# Patient Record
Sex: FEMALE | Race: WHITE | Employment: FULL TIME | URBAN - METROPOLITAN AREA
[De-identification: names, ages, dates, MRNs, and addresses within clinical notes are randomized per-mention and may not be internally consistent; named-entity substitution may affect disease eponyms.]

---

## 2017-03-28 ENCOUNTER — OFFICE VISIT (OUTPATIENT)
Dept: SLEEP MEDICINE | Age: 52
End: 2017-03-28

## 2017-03-28 VITALS
WEIGHT: 293 LBS | HEIGHT: 65 IN | OXYGEN SATURATION: 97 % | HEART RATE: 78 BPM | SYSTOLIC BLOOD PRESSURE: 116 MMHG | BODY MASS INDEX: 48.82 KG/M2 | DIASTOLIC BLOOD PRESSURE: 80 MMHG

## 2017-03-28 DIAGNOSIS — G47.33 OBSTRUCTIVE SLEEP APNEA SYNDROME: Primary | Chronic | ICD-10-CM

## 2017-03-28 DIAGNOSIS — J45.909 UNCOMPLICATED ASTHMA, UNSPECIFIED ASTHMA SEVERITY: Chronic | ICD-10-CM

## 2017-03-28 DIAGNOSIS — E66.01 MORBID OBESITY, UNSPECIFIED OBESITY TYPE (HCC): Chronic | ICD-10-CM

## 2017-03-28 PROCEDURE — 99213 OFFICE O/P EST LOW 20 MIN: CPT | Performed by: INTERNAL MEDICINE

## 2017-03-28 ASSESSMENT — SLEEP AND FATIGUE QUESTIONNAIRES
ESS TOTAL SCORE: 3
HOW LIKELY ARE YOU TO NOD OFF OR FALL ASLEEP WHILE SITTING AND READING: 1
HOW LIKELY ARE YOU TO NOD OFF OR FALL ASLEEP WHILE SITTING AND TALKING TO SOMEONE: 0
HOW LIKELY ARE YOU TO NOD OFF OR FALL ASLEEP WHILE SITTING QUIETLY AFTER LUNCH WITHOUT ALCOHOL: 0
HOW LIKELY ARE YOU TO NOD OFF OR FALL ASLEEP WHILE SITTING INACTIVE IN A PUBLIC PLACE: 0
HOW LIKELY ARE YOU TO NOD OFF OR FALL ASLEEP WHILE LYING DOWN TO REST IN THE AFTERNOON WHEN CIRCUMSTANCES PERMIT: 1
HOW LIKELY ARE YOU TO NOD OFF OR FALL ASLEEP WHILE WATCHING TV: 1
HOW LIKELY ARE YOU TO NOD OFF OR FALL ASLEEP WHEN YOU ARE A PASSENGER IN A CAR FOR AN HOUR WITHOUT A BREAK: 0
HOW LIKELY ARE YOU TO NOD OFF OR FALL ASLEEP IN A CAR, WHILE STOPPED FOR A FEW MINUTES IN TRAFFIC: 0

## 2017-03-28 ASSESSMENT — ENCOUNTER SYMPTOMS
SHORTNESS OF BREATH: 0
RHINORRHEA: 0
COUGH: 0
APNEA: 0
CHOKING: 0

## 2017-12-20 ENCOUNTER — OFFICE VISIT (OUTPATIENT)
Dept: ORTHOPEDIC SURGERY | Age: 52
End: 2017-12-20

## 2017-12-20 VITALS — HEIGHT: 65 IN | BODY MASS INDEX: 48.82 KG/M2 | WEIGHT: 293 LBS

## 2017-12-20 DIAGNOSIS — M25.561 ACUTE PAIN OF RIGHT KNEE: Primary | ICD-10-CM

## 2017-12-20 PROCEDURE — 99214 OFFICE O/P EST MOD 30 MIN: CPT | Performed by: ORTHOPAEDIC SURGERY

## 2017-12-20 PROCEDURE — 73564 X-RAY EXAM KNEE 4 OR MORE: CPT | Performed by: ORTHOPAEDIC SURGERY

## 2017-12-20 RX ORDER — NAPROXEN 500 MG/1
500 TABLET ORAL 2 TIMES DAILY WITH MEALS
Qty: 60 TABLET | Refills: 2 | Status: SHIPPED | OUTPATIENT
Start: 2017-12-20 | End: 2018-03-19 | Stop reason: SDUPTHER

## 2017-12-20 NOTE — PROGRESS NOTES
Examination  Inspection:  No abrasions no lacerations no signs of infection or obvious deformity moderate obvious  swelling and joint effusion     Palpation:   Palpation reveals moderate  Pain along the medial joint line,   Mild lateral pain along the joint line ,  mild joint effusion noted today. Range of Motion:  0 - 140° flexion/ extension   Hip extension to 20 hip flexion to 70+  Lumbar ROM -20 extension flexion to 6 inches from the floor      Strength: Quadriceps testing 5/5 , hamstring muscle testing 5/5, EHL against resistance is 5/5, hip flexor strength is intact 5/5, internal and external rotation of the hip against resistance is also intact 5/5    Special Tests: stable Lachman, negative anterior drawer, negative pivot shift, no posterior sag no posterior drawer does not open to valgus or varus stress at 0 or 30° positive, Steinmann's positive, Yenny's negative, Homans negative Justin, pedal pulses are +2/4 capillary refill is brisk sensation is intact ankle exam and hip exam are shows no pain with full range of motion provocative testing of the hip is nonpainful muscle testing around the hip is 5 over 5. Lumbar flexion to 6 inches from floor with out pain    Gait: antalgic     Reflex:    Lower extremity Deep tendon reflexes +2/4 and equal bilaterally for patella and Achilles  Upper extremity reflexes:  of the biceps, triceps, brachioradialis +2/4 equal bilaterally    Contralateral  Knee: Negative Lachman negative anterior drawer negative pivot shift no posterior sag no posterior drawer does not open to valgus or varus stress at 0 or 30° negative Steinmann's negative Yenny's negative Homans negative Justin pedal pulses are +2/4 capillary refill is brisk sensation is intact ankle exam and hip exam are shows no pain with full range of motion provocative testing of the hip is nonpainful muscle testing around the hip is 5 over 5.       Lumbar exam:    L1: good strength of the iliopsoas muscle upper lateral thigh sensation is intact  L2: good strength of the iliopsoas muscle and medial epicondyle sensation is intact Lateral thigh sensation is intact  L3: good strength with out pain of obturator externus muscle sensation is intact to medial epicondyle of the femur   L4:Good quadratus strength and gluteus medius and minimus strength, sensation is intact to medial malleolus  L5:Intact Extensor hallucis and tibialis posterior strength, sensation is intact to dorsum of the foot. S1:  Plantar foot sensation is intact  S2:  Posterior thigh and calf sensation is intact      Hip and lumbar testing does not reproduce pain provocative testing does not reproduce the symptomatology. Additional Examinations:  Thoracic Spine: Examination of the thoracic spine does not show any tenderness, deformity or injury. Range of motion is unremarkable. There is no gross instability. There are no rashes, ulcerations or lesions. Strength and tone are normal.  Lower Back: Examination of the lower back does not show any tenderness, deformity or injury. Range of motion is unremarkable. There is no gross instability. There are no rashes, ulcerations or lesions. Strength and tone are normal.          IMPRESSION:    Diagnostic testing:  X-rays obtained and reviewed in office:  I reviewed multiple X-rays of the right knee today, anterior posterior, lateral, notch view, skyline view:  Show no fracture no dislocation early signs of arthritic wear, decreased space maintenance, no masses or tumors, no signs of any significant osteopenia, no obvious OCD lesions, no loose bodies seen.        Impression moderate patellofemoral arthritis some decreased joint space  MRI: Ordered today to rule out loose body versus meniscus tear  Labs: None ordered      Past Surgical History:   Procedure Laterality Date     SECTION      CHOLECYSTECTOMY      HERNIA REPAIR      JOINT REPLACEMENT      KNEE    KNEE ARTHROSCOPY      KNEE ARTHROSCOPY  12/17/10    left partial medial menisectomy,chondroplasy and synovectomy    KNEE ARTHROSCOPY Left 5/3/13    PARTIAL MEDIAL AND LATERAL MENISECTOMY,CHONDROPLASTY,MEDIAL FEMORAL CHONDYLE,, LATERAL FEMORAL CHONDYLE AND SYNOVECTOMY    OTHER SURGICAL HISTORY  2/26/15    SEPTAL RECONSTRUCTION AND REDUCTION OF INFERIOR TURBINATES    TONSILLECTOMY     . Office Procedures:  Orders Placed This Encounter   Procedures    XR KNEE RIGHT (MIN 4 VIEWS)     [id number]     Order Specific Question:   Reason for exam:     Answer:   Pain       Previous Treatments:  Left knee MRI and total knee arthroplasty, X-ray, anti-inflammatories,    Differential diagnosis: Medial meniscal tear, Lateral meniscal tear, Synovitis,  Loose body, stress fracture, patella femoral syndrome, osteoarthritis, chondral lesion, ACL tear, PCL injury, MCL or LCL injury, Capsular injury, infection, contusion, hip pathology, lumbar radiculopathy, Muscle injury, bone tumor, fracture, femoral acetabular osteoarthritis,    Diagnosis: Right knee osteoarthritis and loose body with meniscus tear        Plan: (Medical Decision Making)    1. Medications - Naprosyn 500 mg  2. PT - in the future  3. Further imaging - MRI  4. Follow up - after MRI    Javier Hawng. [facility]  Board Certified  [employer] Team Physician

## 2018-01-03 ENCOUNTER — OFFICE VISIT (OUTPATIENT)
Dept: ORTHOPEDIC SURGERY | Age: 53
End: 2018-01-03

## 2018-01-03 VITALS — HEIGHT: 65 IN | BODY MASS INDEX: 48.82 KG/M2 | WEIGHT: 293 LBS

## 2018-01-03 DIAGNOSIS — M17.11 PRIMARY OSTEOARTHRITIS OF RIGHT KNEE: Primary | ICD-10-CM

## 2018-01-03 PROCEDURE — 99214 OFFICE O/P EST MOD 30 MIN: CPT | Performed by: ORTHOPAEDIC SURGERY

## 2018-03-19 RX ORDER — NAPROXEN 500 MG/1
TABLET ORAL
Qty: 60 TABLET | Refills: 1 | Status: SHIPPED | OUTPATIENT
Start: 2018-03-19 | End: 2018-05-24 | Stop reason: SDUPTHER

## 2018-03-27 ENCOUNTER — OFFICE VISIT (OUTPATIENT)
Dept: SLEEP MEDICINE | Age: 53
End: 2018-03-27

## 2018-03-27 VITALS
HEIGHT: 65 IN | DIASTOLIC BLOOD PRESSURE: 84 MMHG | OXYGEN SATURATION: 95 % | WEIGHT: 293 LBS | SYSTOLIC BLOOD PRESSURE: 138 MMHG | HEART RATE: 89 BPM | BODY MASS INDEX: 48.82 KG/M2

## 2018-03-27 DIAGNOSIS — E66.01 MORBID OBESITY, UNSPECIFIED OBESITY TYPE (HCC): Chronic | ICD-10-CM

## 2018-03-27 DIAGNOSIS — G47.33 OBSTRUCTIVE SLEEP APNEA SYNDROME: Primary | Chronic | ICD-10-CM

## 2018-03-27 PROCEDURE — 99213 OFFICE O/P EST LOW 20 MIN: CPT | Performed by: INTERNAL MEDICINE

## 2018-03-27 ASSESSMENT — SLEEP AND FATIGUE QUESTIONNAIRES
HOW LIKELY ARE YOU TO NOD OFF OR FALL ASLEEP WHILE LYING DOWN TO REST IN THE AFTERNOON WHEN CIRCUMSTANCES PERMIT: 0
ESS TOTAL SCORE: 4
HOW LIKELY ARE YOU TO NOD OFF OR FALL ASLEEP WHILE SITTING QUIETLY AFTER LUNCH WITHOUT ALCOHOL: 0
HOW LIKELY ARE YOU TO NOD OFF OR FALL ASLEEP WHEN YOU ARE A PASSENGER IN A CAR FOR AN HOUR WITHOUT A BREAK: 1
HOW LIKELY ARE YOU TO NOD OFF OR FALL ASLEEP WHILE WATCHING TV: 1
HOW LIKELY ARE YOU TO NOD OFF OR FALL ASLEEP IN A CAR, WHILE STOPPED FOR A FEW MINUTES IN TRAFFIC: 0
HOW LIKELY ARE YOU TO NOD OFF OR FALL ASLEEP WHILE SITTING AND READING: 2
HOW LIKELY ARE YOU TO NOD OFF OR FALL ASLEEP WHILE SITTING INACTIVE IN A PUBLIC PLACE: 0
HOW LIKELY ARE YOU TO NOD OFF OR FALL ASLEEP WHILE SITTING AND TALKING TO SOMEONE: 0

## 2018-03-27 ASSESSMENT — ENCOUNTER SYMPTOMS
RHINORRHEA: 0
COUGH: 0
APNEA: 0
SHORTNESS OF BREATH: 0
CHOKING: 0

## 2018-03-27 NOTE — LETTER
[facility]  [address]  [facility] [address]  Phone: [phone]  Fax: [phone]    March 27, 2018       Patient: Mendez Hilario   MR Number: [id number]   YOB: 1965   Date of Visit: 3/27/2018       Alyse Roach was seen for a follow up visit today. Here is my assessment and plan as well as an attached copy of her visit today:      ASSESSMENT:  Martin Tierney was seen today for sleep apnea. Diagnoses and all orders for this visit:    Obstructive sleep apnea syndrome    Morbid obesity, unspecified obesity type ([facility] [address]        Plan:     Reviewed compliance download with pt. Supplies and parts as needed for her machine. These are medically necessary. Continue medications per her PCP and other physicians. Limit caffeine use after 3pm.  Encouraged her to work on weight loss through diet and exercise. The primary encounter diagnosis was Obstructive sleep apnea syndrome. A diagnosis of Morbid obesity, unspecified obesity type [facility]) was also pertinent to this visit. The chronic medical conditions listed are directly related to the primary diagnosis listed above. The management of the primary diagnosis affects the secondary diagnosis and vice versa. 12 month f/u. If you have questions or concerns, please do not hesitate to call me. I look forward to following Samra along with you.     Sincerely,      Marin Pryor MD    CC providers:  Radha Argueta [phone]  VIA Mail

## 2018-03-27 NOTE — PROGRESS NOTES
daily.      albuterol (PROVENTIL HFA;VENTOLIN HFA) 108 (90 BASE) MCG/ACT inhaler Inhale 2 puffs into the lungs every 6 hours as needed for Wheezing.  lovastatin (MEVACOR) 10 MG tablet Take 10 mg by mouth nightly.  desloratadine (CLARINEX) 5 MG tablet Take 5 mg by mouth.  fluticasone (FLONASE) 50 MCG/ACT nasal spray 2 sprays by Nasal route.  pravastatin (PRAVACHOL) 40 MG tablet Take 40 mg by mouth daily.  montelukast (SINGULAIR) 10 MG tablet Take 10 mg by mouth nightly.  Ciclesonide (ALVESCO) 160 MCG/ACT AERS Inhale 1 puff into the lungs 2 times daily.  hydrochlorothiazide (HYDRODIURIL) 25 MG tablet Take 25 mg by mouth daily. No current facility-administered medications for this visit.         Allergies as of 2018 - Review Complete 2018   Allergen Reaction Noted    Biaxin [clarithromycin] Shortness Of Breath and Swelling 12/10/2010    Cephalexin Shortness Of Breath and Swelling 12/10/2010    Clindamycin/lincomycin Hives 12/10/2010    Penicillins Hives 12/10/2010    Sulfa antibiotics  2016       Patient Active Problem List   Diagnosis    Arthritis of knee    Chronic sinusitis    Nasal obstruction    Nasal turbinate hypertrophy    Nasal septal deviation    Sleep apnea, obstructive    Acute bacterial rhinosinusitis    Obstructive sleep apnea    Obstructive sleep apnea    Primary osteoarthritis of right knee       Past Medical History:   Diagnosis Date    Asthma     Hyperlipemia     Obstructive sleep apnea     Pneumonia     Swelling of both ankles        Past Surgical History:   Procedure Laterality Date     SECTION      CHOLECYSTECTOMY      HERNIA REPAIR      JOINT REPLACEMENT      KNEE    KNEE ARTHROSCOPY      KNEE ARTHROSCOPY  12/17/10    left partial medial menisectomy,chondroplasy and synovectomy    KNEE ARTHROSCOPY Left 5/3/13    PARTIAL MEDIAL AND LATERAL MENISECTOMY,CHONDROPLASTY,MEDIAL FEMORAL CHONDYLE,, LATERAL FEMORAL

## 2018-04-11 ENCOUNTER — OFFICE VISIT (OUTPATIENT)
Dept: ORTHOPEDIC SURGERY | Age: 53
End: 2018-04-11

## 2018-04-11 ENCOUNTER — TELEPHONE (OUTPATIENT)
Dept: ORTHOPEDIC SURGERY | Age: 53
End: 2018-04-11

## 2018-04-11 VITALS — BODY MASS INDEX: 50.02 KG/M2 | HEIGHT: 64 IN | WEIGHT: 293 LBS

## 2018-04-11 DIAGNOSIS — M17.11 PRIMARY OSTEOARTHRITIS OF RIGHT KNEE: Primary | ICD-10-CM

## 2018-04-11 PROCEDURE — 20610 DRAIN/INJ JOINT/BURSA W/O US: CPT | Performed by: ORTHOPAEDIC SURGERY

## 2018-04-11 PROCEDURE — 99214 OFFICE O/P EST MOD 30 MIN: CPT | Performed by: ORTHOPAEDIC SURGERY

## 2018-05-09 ENCOUNTER — OFFICE VISIT (OUTPATIENT)
Dept: ORTHOPEDIC SURGERY | Age: 53
End: 2018-05-09

## 2018-05-09 VITALS — HEIGHT: 64 IN | BODY MASS INDEX: 50.02 KG/M2 | WEIGHT: 293 LBS

## 2018-05-09 DIAGNOSIS — M17.11 PRIMARY OSTEOARTHRITIS OF RIGHT KNEE: Primary | ICD-10-CM

## 2018-05-09 PROCEDURE — 99214 OFFICE O/P EST MOD 30 MIN: CPT | Performed by: ORTHOPAEDIC SURGERY

## 2018-05-09 PROCEDURE — 20610 DRAIN/INJ JOINT/BURSA W/O US: CPT | Performed by: ORTHOPAEDIC SURGERY

## 2018-05-24 RX ORDER — NAPROXEN 500 MG/1
TABLET ORAL
Qty: 60 TABLET | Refills: 0 | Status: SHIPPED | OUTPATIENT
Start: 2018-05-24 | End: 2018-12-01 | Stop reason: SDUPTHER

## 2018-11-12 DIAGNOSIS — M17.11 PRIMARY OSTEOARTHRITIS OF RIGHT KNEE: Primary | ICD-10-CM

## 2018-11-13 ENCOUNTER — TELEPHONE (OUTPATIENT)
Dept: ORTHOPEDIC SURGERY | Age: 53
End: 2018-11-13

## 2018-11-28 ENCOUNTER — OFFICE VISIT (OUTPATIENT)
Dept: ORTHOPEDIC SURGERY | Age: 53
End: 2018-11-28
Payer: COMMERCIAL

## 2018-11-28 VITALS — BODY MASS INDEX: 48.82 KG/M2 | HEIGHT: 65 IN | WEIGHT: 293 LBS

## 2018-11-28 DIAGNOSIS — M17.11 PRIMARY OSTEOARTHRITIS OF RIGHT KNEE: Primary | ICD-10-CM

## 2018-11-28 PROCEDURE — 99214 OFFICE O/P EST MOD 30 MIN: CPT | Performed by: ORTHOPAEDIC SURGERY

## 2018-11-28 PROCEDURE — 20610 DRAIN/INJ JOINT/BURSA W/O US: CPT | Performed by: ORTHOPAEDIC SURGERY

## 2018-11-28 NOTE — PROGRESS NOTES
lateral joint line pain,  moderate joint effusion    Range of Motion:  0 - 150° flexion/ extension   Hip extension to 20 hip flexion to 70+  Lumbar ROM -20 extension flexion to 6 inches from the floor      Strength: Quadriceps testing 5/5 , hamstring muscle testing 5/5, EHL against resistance is 5/5, hip flexor strength is intact 5/5, internal and external rotation of the hip against resistance is also intact 5/5    Special Tests: stable Lachman, negative anterior drawer, negative pivot shift, no posterior sag no posterior drawer does not open to valgus or varus stress at 0 or 30° negative, Steinmann's negative, Yenny's negative, Homans negative Justin, pedal pulses are +2/4 capillary refill is brisk sensation is intact ankle exam and hip exam are shows no pain with full range of motion provocative testing of the hip is nonpainful muscle testing around the hip is 5 over 5. Lumbar flexion to 6 inches from floor with out pain      Inspection:      Gait: antalgic     Reflex:    Lower extremity Deep tendon reflexes +2/4 and equal bilaterally for patella and Achilles  Upper extremity reflexes:  of the biceps, triceps, brachioradialis +2/4 equal bilaterally    Contralateral  Knee: Negative Lachman negative anterior drawer negative pivot shift no posterior sag no posterior drawer does not open to valgus or varus stress at 0 or 30° negative Steinmann's negative Yenny's negative Homans negative Justin pedal pulses are +2/4 capillary refill is brisk sensation is intact ankle exam and hip exam are shows no pain with full range of motion provocative testing of the hip is nonpainful muscle testing around the hip is 5 over 5. Hip and lumbar testing does not reproduce pain evocative testing does not reproduce symptomatology. Additional Examinations:  Left Lower Extremity: Examination of the left lower extremity does not show any tenderness, deformity or injury. Range of motion is unremarkable.   There is no gross instability. There are no rashes, ulcerations or lesions. Strength and tone are normal.  Right Upper Extremity:  Examination of the right upper extremity does not show any tenderness, deformity or injury. Range of motion is unremarkable. There is no gross instability. There are no rashes, ulcerations or lesions. Strength and tone are normal.  Left Upper Extremity: Examination of the left upper extremity does not show any tenderness, deformity or injury. Range of motion is unremarkable. There is no gross instability. There are no rashes, ulcerations or lesions. Strength and tone are normal.  Lower Back: Examination of the lower back does not show any tenderness, deformity or injury. Range of motion is unremarkable. There is no gross instability. There are no rashes, ulcerations or lesions. Strength and tone are normal.    Past Surgical History:   Procedure Laterality Date     SECTION      CHOLECYSTECTOMY      HERNIA REPAIR      JOINT REPLACEMENT      KNEE    KNEE ARTHROSCOPY      KNEE ARTHROSCOPY  12/17/10    left partial medial menisectomy,chondroplasy and synovectomy    KNEE ARTHROSCOPY Left 5/3/13    PARTIAL MEDIAL AND LATERAL MENISECTOMY,CHONDROPLASTY,MEDIAL FEMORAL CHONDYLE,, LATERAL FEMORAL CHONDYLE AND SYNOVECTOMY    OTHER SURGICAL HISTORY  2/26/15    SEPTAL RECONSTRUCTION AND REDUCTION OF INFERIOR TURBINATES    TONSILLECTOMY           Assessment :  Osteoarthritis right knee    Impression: Osteoarthritis right knee    Office Procedures: The patient is symptomatic from right osteoarthritis of the knee joint with documented radiological signs of arthritis. The patient has also failed 3 months of conservative treatment including home exercise, education, Tylenol and/or NSAIDs use. The patient was offered a Visco supplementation today. Risks, benefits, and alternatives to the injections were discussed in detail with the patient.  The risks discussed included but are not limited to infection, skin reactions, hot swollen joints, and anaphylaxis. The patient gave verbal informed consent for the injection. The patient's skin was prepped with  sterile gauze  pads soaked with alcohol solution and with an 18 gauge needle the right  knee joint was injected with 4 ml of Monovisc intra-articularly under sterile conditions. The patient tolerated the injection reasonably well. The patient was given instructions to ice the right knee and avoid strenuous activities for 24-48 hours. The patient was instructed to call the office immediately if there is increased pain, redness, warmth, fever, or chills. Orders Placed This Encounter   Procedures    MI ARTHROCENTESIS ASPIR&/INJ MAJOR JT/BURSA W/O US    MONOVISC INJ       Previous Treatments:  X-ray, MRI, arthroscopy, physical therapy, injections over the years    Possible other diagnoses:      Treatment Plan:  Injection today follow-up as needed      Evans Carter. RONEY [facility]. [address] [place] [address] 20 and Sports Medicine  [facility]  Board Certified  [employer] Team Physician        Disclaimer: \"This note was dictated with voice recognition software. Though review and correction are routine, we apologize for any errors. \"

## 2018-12-03 RX ORDER — NAPROXEN 500 MG/1
TABLET ORAL
Qty: 60 TABLET | Refills: 3 | Status: SHIPPED | OUTPATIENT
Start: 2018-12-03 | End: 2019-04-12 | Stop reason: SDUPTHER

## 2019-04-12 RX ORDER — NAPROXEN 500 MG/1
TABLET ORAL
Qty: 60 TABLET | Refills: 3 | Status: SHIPPED | OUTPATIENT
Start: 2019-04-12 | End: 2019-08-11 | Stop reason: SDUPTHER

## 2019-06-10 ENCOUNTER — OFFICE VISIT (OUTPATIENT)
Dept: PULMONOLOGY | Age: 54
End: 2019-06-10
Payer: COMMERCIAL

## 2019-06-10 VITALS
HEIGHT: 64 IN | WEIGHT: 293 LBS | OXYGEN SATURATION: 97 % | BODY MASS INDEX: 50.02 KG/M2 | SYSTOLIC BLOOD PRESSURE: 120 MMHG | HEART RATE: 78 BPM | DIASTOLIC BLOOD PRESSURE: 62 MMHG

## 2019-06-10 DIAGNOSIS — G47.33 OBSTRUCTIVE SLEEP APNEA: Primary | Chronic | ICD-10-CM

## 2019-06-10 DIAGNOSIS — J30.9 ALLERGIC RHINITIS, UNSPECIFIED SEASONALITY, UNSPECIFIED TRIGGER: ICD-10-CM

## 2019-06-10 DIAGNOSIS — E66.01 MORBID OBESITY, UNSPECIFIED OBESITY TYPE (HCC): ICD-10-CM

## 2019-06-10 PROCEDURE — 99213 OFFICE O/P EST LOW 20 MIN: CPT | Performed by: INTERNAL MEDICINE

## 2019-06-10 ASSESSMENT — SLEEP AND FATIGUE QUESTIONNAIRES
HOW LIKELY ARE YOU TO NOD OFF OR FALL ASLEEP IN A CAR, WHILE STOPPED FOR A FEW MINUTES IN TRAFFIC: 0
HOW LIKELY ARE YOU TO NOD OFF OR FALL ASLEEP WHILE WATCHING TV: 1
HOW LIKELY ARE YOU TO NOD OFF OR FALL ASLEEP WHILE SITTING AND READING: 0
HOW LIKELY ARE YOU TO NOD OFF OR FALL ASLEEP WHILE SITTING QUIETLY AFTER LUNCH WITHOUT ALCOHOL: 0
HOW LIKELY ARE YOU TO NOD OFF OR FALL ASLEEP WHILE SITTING INACTIVE IN A PUBLIC PLACE: 0
HOW LIKELY ARE YOU TO NOD OFF OR FALL ASLEEP WHILE SITTING AND TALKING TO SOMEONE: 0
ESS TOTAL SCORE: 3
HOW LIKELY ARE YOU TO NOD OFF OR FALL ASLEEP WHILE LYING DOWN TO REST IN THE AFTERNOON WHEN CIRCUMSTANCES PERMIT: 1
HOW LIKELY ARE YOU TO NOD OFF OR FALL ASLEEP WHEN YOU ARE A PASSENGER IN A CAR FOR AN HOUR WITHOUT A BREAK: 1

## 2019-06-10 ASSESSMENT — ENCOUNTER SYMPTOMS
CHOKING: 0
RHINORRHEA: 0
SHORTNESS OF BREATH: 0
COUGH: 0
APNEA: 0

## 2019-06-10 NOTE — PROGRESS NOTES
No HA, dryness, or congestion. No issues using her machine. allergic rhinitis and obesity: stable on meds and followed by pt's PCP and other physicians. [facility]    Strang - Total score: 3    Social History     Socioeconomic History    Marital status:      Spouse name: Not on file    Number of children: Not on file    Years of education: Not on file    Highest education level: Not on file   Occupational History    Not on file   Social Needs    Financial resource strain: Not on file    Food insecurity:     Worry: Not on file     Inability: Not on file    Transportation needs:     Medical: Not on file     Non-medical: Not on file   Tobacco Use    Smoking status: Never Smoker    Smokeless tobacco: Never Used   Substance and Sexual Activity    Alcohol use: No    Drug use: No    Sexual activity: Not on file   Lifestyle    Physical activity:     Days per week: Not on file     Minutes per session: Not on file    Stress: Not on file   Relationships    Social connections:     Talks on phone: Not on file     Gets together: Not on file     Attends Christian service: Not on file     Active member of club or organization: Not on file     Attends meetings of clubs or organizations: Not on file     Relationship status: Not on file    Intimate partner violence:     Fear of current or ex partner: Not on file     Emotionally abused: Not on file     Physically abused: Not on file     Forced sexual activity: Not on file   Other Topics Concern    Not on file   Social History Narrative    Not on file       Current Outpatient Medications   Medication Sig Dispense Refill    naproxen (NAPROSYN) 500 MG tablet TAKE ONE TABLET BY MOUTH TWICE A DAY WITH MEALS 60 tablet 3    celecoxib (CELEBREX) 200 MG capsule Take 200 mg by mouth 2 times daily.  albuterol (PROVENTIL HFA;VENTOLIN HFA) 108 (90 BASE) MCG/ACT inhaler Inhale 2 puffs into the lungs every 6 hours as needed for Wheezing.       lovastatin (MEVACOR) 10 MG tablet Take 10 mg by mouth nightly.  desloratadine (CLARINEX) 5 MG tablet Take 5 mg by mouth.  fluticasone (FLONASE) 50 MCG/ACT nasal spray 2 sprays by Nasal route.  pravastatin (PRAVACHOL) 40 MG tablet Take 40 mg by mouth daily.  montelukast (SINGULAIR) 10 MG tablet Take 10 mg by mouth nightly.  Ciclesonide (ALVESCO) 160 MCG/ACT AERS Inhale 1 puff into the lungs 2 times daily.  hydrochlorothiazide (HYDRODIURIL) 25 MG tablet Take 25 mg by mouth daily. No current facility-administered medications for this visit.         Allergies as of 06/10/2019 - Review Complete 06/10/2019   Allergen Reaction Noted    Biaxin [clarithromycin] Shortness Of Breath and Swelling 12/10/2010    Cephalexin Shortness Of Breath and Swelling 12/10/2010    Clindamycin/lincomycin Hives 12/10/2010    Penicillins Hives 12/10/2010    Sulfa antibiotics  2016       Patient Active Problem List   Diagnosis    Arthritis of knee    Chronic sinusitis    Nasal obstruction    Nasal turbinate hypertrophy    Nasal septal deviation    Acute bacterial rhinosinusitis    Obstructive sleep apnea    Primary osteoarthritis of right knee    Allergic rhinitis    Morbid obesity, unspecified obesity type ([facility] [address]       Past Medical History:   Diagnosis Date    Asthma     Hyperlipemia     Obstructive sleep apnea     Pneumonia     Swelling of both ankles        Past Surgical History:   Procedure Laterality Date     SECTION      CHOLECYSTECTOMY      HERNIA REPAIR      JOINT REPLACEMENT      KNEE    KNEE ARTHROSCOPY      KNEE ARTHROSCOPY  12/17/10    left partial medial menisectomy,chondroplasy and synovectomy    KNEE ARTHROSCOPY Left 5/3/13    PARTIAL MEDIAL AND LATERAL MENISECTOMY,CHONDROPLASTY,MEDIAL FEMORAL CHONDYLE,, LATERAL FEMORAL CHONDYLE AND SYNOVECTOMY    OTHER SURGICAL HISTORY  2/26/15    SEPTAL RECONSTRUCTION AND REDUCTION OF INFERIOR TURBINATES    TONSILLECTOMY         Family History   Problem Relation Age of Onset    Diabetes Mother     Asthma Mother            ROS:  Review of Systems   Constitutional: Negative. HENT: Negative for congestion, ear pain and rhinorrhea. Respiratory: Negative for apnea, cough, choking and shortness of breath. Cardiovascular: Negative for chest pain, palpitations and leg swelling. Musculoskeletal: Negative for neck pain. Vitals:  Weight BMI   Wt Readings from Last 3 Encounters:   06/10/19 (!) 308 lb (139.7 kg)   11/28/18 (!) 314 lb (142.4 kg)   05/09/18 (!) 315 lb 0.6 oz (142.9 kg)    Body mass index is 52.87 kg/m².      BP HR SaO2   BP Readings from Last 3 Encounters:   06/10/19 120/62   03/27/18 138/84   03/28/17 116/80    Pulse Readings from Last 3 Encounters:   06/10/19 78   03/27/18 89   03/28/17 78    SpO2 Readings from Last 3 Encounters:   06/10/19 97%   03/27/18 95%   03/28/17 97%          Electronically signed by Melany Ryan MD on 6/10/2019 at 3:07 PM

## 2019-06-10 NOTE — LETTER
[facility]  [address] [place] [address] Drive  Phone: [phone]  Fax: [phone]    Ericka 10, 2019       Patient: Hanna Zayas   MR Number: [id number]   YOB: 1965   Date of Visit: 6/10/2019       Huy Isaacs was seen for a follow up visit today. Here is my assessment and plan as well as an attached copy of her visit today:    Obstructive sleep apnea  Chronic- Stable. Reviewed compliance download with pt. Supplies and parts as needed for her machine. These are medically necessary. Continue medications per her PCP and other physicians. Limit caffeine use after 3pm.    Allergic rhinitis  Chronic- Stable. Cont meds per PCP and other physicians. Morbid obesity, unspecified obesity type ([address]  Chronic-Stable. Encouraged her to work on weight loss through diet and exercise. If you have questions or concerns, please do not hesitate to call me. I look forward to following Samra along with you.     Sincerely,    Mellisa De Paz MD    CC providers:  Radha Flynn [address]  VIA Mail

## 2019-08-12 RX ORDER — NAPROXEN 500 MG/1
TABLET ORAL
Qty: 60 TABLET | Refills: 2 | Status: SHIPPED | OUTPATIENT
Start: 2019-08-12 | End: 2019-11-17 | Stop reason: SDUPTHER

## 2019-11-18 RX ORDER — NAPROXEN 500 MG/1
TABLET ORAL
Qty: 60 TABLET | Refills: 1 | Status: SHIPPED | OUTPATIENT
Start: 2019-11-18 | End: 2020-01-13

## 2020-01-13 RX ORDER — NAPROXEN 500 MG/1
TABLET ORAL
Qty: 60 TABLET | Refills: 0 | Status: SHIPPED | OUTPATIENT
Start: 2020-01-13 | End: 2020-02-10

## 2020-02-10 RX ORDER — NAPROXEN 500 MG/1
TABLET ORAL
Qty: 60 TABLET | Refills: 0 | Status: SHIPPED | OUTPATIENT
Start: 2020-02-10 | End: 2020-03-13

## 2020-03-13 RX ORDER — NAPROXEN 500 MG/1
TABLET ORAL
Qty: 60 TABLET | Refills: 0 | Status: SHIPPED | OUTPATIENT
Start: 2020-03-13 | End: 2020-05-04

## 2020-05-04 RX ORDER — NAPROXEN 500 MG/1
TABLET ORAL
Qty: 60 TABLET | Refills: 0 | Status: SHIPPED | OUTPATIENT
Start: 2020-05-04 | End: 2020-06-17

## 2020-05-21 ENCOUNTER — TELEPHONE (OUTPATIENT)
Dept: PULMONOLOGY | Age: 55
End: 2020-05-21

## 2020-05-22 ENCOUNTER — TELEPHONE (OUTPATIENT)
Dept: PULMONOLOGY | Age: 55
End: 2020-05-22

## 2020-06-08 ENCOUNTER — VIRTUAL VISIT (OUTPATIENT)
Dept: PULMONOLOGY | Age: 55
End: 2020-06-08
Payer: COMMERCIAL

## 2020-06-08 PROCEDURE — 99214 OFFICE O/P EST MOD 30 MIN: CPT | Performed by: NURSE PRACTITIONER

## 2020-06-08 ASSESSMENT — SLEEP AND FATIGUE QUESTIONNAIRES
HOW LIKELY ARE YOU TO NOD OFF OR FALL ASLEEP WHILE SITTING INACTIVE IN A PUBLIC PLACE: 0
HOW LIKELY ARE YOU TO NOD OFF OR FALL ASLEEP WHILE SITTING QUIETLY AFTER LUNCH WITHOUT ALCOHOL: 0
HOW LIKELY ARE YOU TO NOD OFF OR FALL ASLEEP WHILE LYING DOWN TO REST IN THE AFTERNOON WHEN CIRCUMSTANCES PERMIT: 0
HOW LIKELY ARE YOU TO NOD OFF OR FALL ASLEEP WHILE SITTING AND TALKING TO SOMEONE: 0
HOW LIKELY ARE YOU TO NOD OFF OR FALL ASLEEP IN A CAR, WHILE STOPPED FOR A FEW MINUTES IN TRAFFIC: 0
HOW LIKELY ARE YOU TO NOD OFF OR FALL ASLEEP WHILE SITTING AND READING: 0
HOW LIKELY ARE YOU TO NOD OFF OR FALL ASLEEP WHILE WATCHING TV: 1
HOW LIKELY ARE YOU TO NOD OFF OR FALL ASLEEP WHEN YOU ARE A PASSENGER IN A CAR FOR AN HOUR WITHOUT A BREAK: 0
ESS TOTAL SCORE: 1

## 2020-06-08 NOTE — LETTER
[facility]  [address] [place] [address]  Phone: [phone]  Fax: [phone]    June 8, 2020       Patient: Spencer Urrutia   MR Number: [id number]   YOB: 1965   Date of Visit: 6/8/2020       Toni Willett was seen for a follow up visit today. Here is my assessment and plan as well as an attached copy of her visit today: Morbid obesity, unspecified obesity type ([address]  Chronic-Stable. Encouraged her to work on weight loss through diet and exercise. Allergic rhinitis  Chronic- Stable. Cont meds per PCP and other physicians. Obstructive sleep apnea  Reviewed compliance download with pt. Supplies and parts as needed for her machine. These are medically necessary. Continue medications per her PCP and other physicians. Limit caffeine use after 3pm.  Encouraged her to work on weight loss through diet and exercise. Diagnoses of Morbid obesity, unspecified obesity type ([address] and Allergic rhinitis, unspecified seasonality, unspecified trigger were pertinent to this visit. The chronic medical conditions listed are directly related to the primary diagnosis listed above. The management of the primary diagnosis affects the secondary diagnosis and vice versa. If you have questions or concerns, please do not hesitate to call me. I look forward to following Samra along with you.     Sincerely,    RAO Oliveira - CNP    CC providers:  Radha Kirkpatrick [address]  VIA Mail

## 2020-06-08 NOTE — PROGRESS NOTES
Manuelito Briones MD, FAASM, [facility]  Maggie Carpenter, MSN, RN, CNP     [address] [facility] SLEEP MEDICINE  [address] [address] [facility] [address]  Dept: [phone]  Dept Fax: : Leti Beltre [facility] SLEEP MEDICINE  [address] [place] [id number] [phone]    Subjective:     Patient ID: Elyse De La Garza is a 60 y.o. female. Chief Complaint   Patient presents with    Sleep Apnea       HPI:      Sleep Medicine Video Visit    Pursuant to the emergency declaration under the [address] [place] [address], Critical access hospital waiver authority and the [employer] and [employer] Act this Telephone Visit was insisted, with patient's consent, to reduce the patient's risk of exposure to COVID-19 and provide continuity of care for an established patient. Services were provided through a synchronous discussion over a telephone and/or Video chat to substitute for in-person clinic visit, and coded as such. Machine Modem/Download Info:  Compliance (hours/night): 4.75 hrs/night  Download AHI (/hour): 0.8 /HR     Average IPAP Pressure: 16.5 cmH2O  Average EPAP Pressure: 11.3 cmH2O         AUTO BIPAP - Settings (Shakeel)  IPAP Max: 25 cmH2O  EPAP Min: 11 cmH2O  Pressure Support Min: 4  Pressure Support Max: 6             Comfort Settings  Humidity Level (0-8): 2  Flex/EPR (0-3): 3 PAP Mask  Mask Type: Full Face mask     [place] - Total score: 1    Follow-up :     Last Visit : June 2019      Patient reports the listed chronic Co-morbidities: Obesity, Allergies,    are well controlled and stable at this time.      Subjective Health Changes: COVID + with 3 days hospitalization      Over Night Oximetry: [] Yes  [] No  [x] NA [] WNL   Using O2: [] Yes  [] No  [x] NA   Patient is compliant with the machine  [x] Yes  [] No   Feeling rested when using the machine   [x] Yes caffeine use after 3pm.  Encouraged her to work on weight loss through diet and exercise. Diagnoses of Morbid obesity, unspecified obesity type ([address] and Allergic rhinitis, unspecified seasonality, unspecified trigger were pertinent to this visit. The chronic medical conditions listed are directly related to the primary diagnosis listed above. The management of the primary diagnosis affects the secondary diagnosis and vice versa. The primary encounter diagnosis was Obstructive sleep apnea. Diagnoses of Morbid obesity, unspecified obesity type ([address] and Allergic rhinitis, unspecified seasonality, unspecified trigger were also pertinent to this visit. The chronic medical conditions listed are directly related to the primary diagnosis listed above. The management of the primary diagnosis affects the secondary diagnosis and vice versa. - Educated patient and reviewed compliance download with pt.    -Supplies and parts as needed for her machine, these are medically necessary.    - Patient using [employer] for supplies  -Continue medications per her PCP and other physicians.   -Limit caffeine use after 3pm.    -Encouraged her to work on weight loss through diet and exercise. -  Patient able to access video feed. Visit completed via video chat communications. 20 min spent with patient.   - Education on calling the office if the DME is unable to fix the unit (if broken beyond repair will need a new on ordered). Will need a new machine next appointment if not before   -F/U: 12 month. No orders of the defined types were placed in this encounter. No orders of the defined types were placed in this encounter. No orders of the defined types were placed in this encounter.       Migel Campbell, MSN, RN, CNP

## 2020-06-08 NOTE — PROGRESS NOTES
Barry Natarajan         : 1965    Diagnosis: [x] ALEXY (G47.33) [] CSA (G47.31) [] Apnea (G47.30)   Length of Need: [x] 12 Months [] 99 Months [] Other:    Machine (KORI!): [x] Respironics Dream Station      Auto [] ResMed AirSense     Auto [] Other:     []  CPAP () [] Bilevel ()   Mode: [] Auto [] Spontaneous    Mode: [] Auto [] Spontaneous                            Comfort Settings:   - Ramp Pressure:  cmH2O                                        - Ramp time: 15 min                                     -  Flex/EPR - 3 full time                                    - For ResMed Bilevel (TiMax-4 sec   TiMin- 0.2 sec)     Humidifier: [x] Heated ()        [x] Water chamber replacement ()/ 1 per 6 months        Mask:   [] Nasal () /1 per 3 months [x] Full Face () /1 per 3 months   [] Patient choice -Size and fit mask [x] Patient Choice - Size and fit mask   [] Dispense:  [] Dispense:    [] Headgear () / 1 per 3 months [x] Headgear () / 1 per 3 months   [] Replacement Nasal Cushion ()/2 per month [x] Interface Replacement ()/1 per month   [] Replacement Nasal Pillows ()/2 per month         Tubing: [x] Heated ()/1 per 3 months    [] Standard ()/1 per 3 months [] Other:           Filters: [x] Non-disposable ()/1 per 6 months     [x] Ultra-Fine, Disposable ()/2 per month        Miscellaneous: [] Chin Strap ()/ 1 per 6 months [] O2 bleed-in:       LPM   [] Oximetry on CPAP/Bilevel []  Other:    [x] Modem: ()         Start Order Date: 20    MEDICAL JUSTIFICATION:  I, the undersigned, certify that the above prescribed supplies are medically necessary for this patients wellbeing. In my opinion, the supplies are both reasonable and necessary in reference to accepted standards of medicalpractice in treatment of this patients condition.     RAO Ma - JEFFREY      NPI: [id number]       Order Signed Date: 06/08/20    Electronically signed by RAO Andrews CNP on 6/8/2020 at 11:20 AM

## 2020-06-08 NOTE — ASSESSMENT & PLAN NOTE
Reviewed compliance download with pt. Supplies and parts as needed for her machine. These are medically necessary. Continue medications per her PCP and other physicians. Limit caffeine use after 3pm.  Encouraged her to work on weight loss through diet and exercise. Diagnoses of Morbid obesity, unspecified obesity type ([address] and Allergic rhinitis, unspecified seasonality, unspecified trigger were pertinent to this visit. The chronic medical conditions listed are directly related to the primary diagnosis listed above. The management of the primary diagnosis affects the secondary diagnosis and vice versa.

## 2020-06-17 RX ORDER — NAPROXEN 500 MG/1
TABLET ORAL
Qty: 60 TABLET | Refills: 0 | Status: SHIPPED | OUTPATIENT
Start: 2020-06-17 | End: 2020-07-17

## 2020-06-18 ENCOUNTER — TELEPHONE (OUTPATIENT)
Dept: PULMONOLOGY | Age: 55
End: 2020-06-18

## 2020-06-18 NOTE — PROGRESS NOTES
Soraya Asher         : 1965    Diagnosis: [x] ALEXY (G47.33) [] CSA (G47.31) [] Apnea (G47.30)   Length of Need: [x] 12 Months [] 99 Months [] Other:    Machine (KORI!): [x] Respironics Dream Station      Auto [] ResMed AirSense     Auto [] Other:     []  CPAP () [x] Bilevel ()   Mode: [] Auto [] Spontaneous    Mode: [x] Auto [] Spontaneous            IPAP Max: 25 cmH2O  EPAP Min: 11 cmH2O  Pressure Support Min: 4  Pressure Support Max: 6       Comfort Settings:   - Ramp Pressure:  5 cmH2O                                        - Ramp time: 15 min                                     -  Flex/EPR - 3 full time                                    - For ResMed Bilevel (TiMax-4 sec   TiMin- 0.2 sec)     Humidifier: [x] Heated ()        [x] Water chamber replacement ()/ 1 per 6 months        Mask:   [] Nasal () /1 per 3 months [x] Full Face () /1 per 3 months   [] Patient choice -Size and fit mask [x] Patient Choice - Size and fit mask   [] Dispense:  [] Dispense:    [] Headgear () / 1 per 3 months [x] Headgear () / 1 per 3 months   [] Replacement Nasal Cushion ()/2 per month [x] Interface Replacement ()/1 per month   [] Replacement Nasal Pillows ()/2 per month         Tubing: [x] Heated ()/1 per 3 months    [] Standard ()/1 per 3 months [] Other:           Filters: [x] Non-disposable ()/1 per 6 months     [x] Ultra-Fine, Disposable ()/2 per month        Miscellaneous: [] Chin Strap ()/ 1 per 6 months [] O2 bleed-in:       LPM   [] Oximetry on CPAP/Bilevel []  Other:    [] Modem: ()         Start Order Date: 20    MEDICAL JUSTIFICATION:  I, the undersigned, certify that the above prescribed supplies are medically necessary for this patients wellbeing. In my opinion, the supplies are both reasonable and necessary in reference to accepted standards of medicalpractice in treatment of this patients condition.     Samira SEWELL Olene Goodpasture - CNP      NPI: [id number]       Order Signed Date: 06/18/20    Electronically signed by RAO Mosher CNP on 6/18/2020 at 1:27 PM

## 2020-06-22 ENCOUNTER — TELEPHONE (OUTPATIENT)
Dept: PULMONOLOGY | Age: 55
End: 2020-06-22

## 2020-06-22 ENCOUNTER — CLINICAL DOCUMENTATION (OUTPATIENT)
Dept: PULMONOLOGY | Age: 55
End: 2020-06-22

## 2020-06-22 NOTE — TELEPHONE ENCOUNTER
Note sent to A1, patient prior listed DME was a different supplier please make sure they have the correct supplier

## 2020-07-17 RX ORDER — NAPROXEN 500 MG/1
TABLET ORAL
Qty: 60 TABLET | Refills: 0 | Status: SHIPPED | OUTPATIENT
Start: 2020-07-17 | End: 2020-08-24

## 2020-08-12 ENCOUNTER — VIRTUAL VISIT (OUTPATIENT)
Dept: PULMONOLOGY | Age: 55
End: 2020-08-12
Payer: COMMERCIAL

## 2020-08-12 PROCEDURE — 99214 OFFICE O/P EST MOD 30 MIN: CPT | Performed by: INTERNAL MEDICINE

## 2020-08-12 ASSESSMENT — SLEEP AND FATIGUE QUESTIONNAIRES
HOW LIKELY ARE YOU TO NOD OFF OR FALL ASLEEP WHEN YOU ARE A PASSENGER IN A CAR FOR AN HOUR WITHOUT A BREAK: 0
HOW LIKELY ARE YOU TO NOD OFF OR FALL ASLEEP WHILE SITTING AND READING: 0
HOW LIKELY ARE YOU TO NOD OFF OR FALL ASLEEP WHILE SITTING INACTIVE IN A PUBLIC PLACE: 0
HOW LIKELY ARE YOU TO NOD OFF OR FALL ASLEEP WHILE LYING DOWN TO REST IN THE AFTERNOON WHEN CIRCUMSTANCES PERMIT: 1
HOW LIKELY ARE YOU TO NOD OFF OR FALL ASLEEP WHILE SITTING AND TALKING TO SOMEONE: 0
HOW LIKELY ARE YOU TO NOD OFF OR FALL ASLEEP WHILE SITTING QUIETLY AFTER LUNCH WITHOUT ALCOHOL: 0
HOW LIKELY ARE YOU TO NOD OFF OR FALL ASLEEP WHILE WATCHING TV: 2
HOW LIKELY ARE YOU TO NOD OFF OR FALL ASLEEP IN A CAR, WHILE STOPPED FOR A FEW MINUTES IN TRAFFIC: 0
ESS TOTAL SCORE: 3

## 2020-08-12 NOTE — LETTER
[facility] Sleep Medicine  [place] [address] [place] [address]  Phone: [phone]  Fax: [phone]    August 12, 2020       Patient: Rodney Escoto   MR Number: [id number]   YOB: 1965   Date of Visit: 8/12/2020       Juli Whelan was seen for a follow up visit today. Here is my assessment and plan as well as an attached copy of her visit today:    Obstructive sleep apnea  Chronic- Stable. Reviewed compliance download with pt. Supplies and parts as needed for her machine. These are medically necessary. Continue medications per her PCP and other physicians. Limit caffeine use after 3pm.  12 month f/u. Allergic rhinitis  Chronic- Stable. Cont meds per PCP and other physicians. Morbid obesity, unspecified obesity type ([address]  Chronic-Stable. Encouraged her to work on weight loss through diet and exercise. If you have questions or concerns, please do not hesitate to call me. I look forward to following Samra along with you.     Sincerely,    Lieutenant Jonathan MD    CC providers:  Radha Frias [address] Font[address]  VIA Mail

## 2020-08-12 NOTE — PROGRESS NOTES
Sarah Hallman MD, [facility], [facility]  Tiffanie Kehrjulio [address]  3rd Floor, [facility], [facility] Ruby Hankins E [phone]   [facility] [address] Agnes Hobbs. [facility]. Kahlil Perez [address] [phone]     Video Visit- Follow up    Pursuant to the emergency declaration under the [address] [place] [address], [facility] waiver authority and the [employer] Act, this Virtual  Visit was conducted, with patient's consent, to reduce the patient's risk of exposure to COVID-19 and provide continuity of care for an established patient. Services were provided through a video synchronous discussion virtually to substitute for in-person clinic visit. Patient was located in their home. Assessment/Plan:     Obstructive sleep apnea  Chronic- Stable. Reviewed compliance download with pt. Supplies and parts as needed for her machine. These are medically necessary. Continue medications per her PCP and other physicians. Limit caffeine use after 3pm.  12 month f/u. Allergic rhinitis  Chronic- Stable. Cont meds per PCP and other physicians. Morbid obesity, unspecified obesity type ([address]  Chronic-Stable. Encouraged her to work on weight loss through diet and exercise. Diagnoses of Obstructive sleep apnea, Allergic rhinitis, unspecified seasonality, unspecified trigger, and Morbid obesity, unspecified obesity type ([address] were pertinent to this visit. The chronic medical conditions listed are directly related to the primary diagnosis listed above. The management of the primary diagnosis affects the secondary diagnosis and vice versa. Subjective:     Patient ID: Rob Hester is a 60 y.o. female.     Chief Complaint   Patient presents with    Sleep Apnea     Subjective   HPI:    Machine Modem/Download Info:  Compliance (hours/night): 6.75 hrs/night  Download AHI (/hour): 1 /HR  Average IPAP Pressure: 11 cmH2O  Average EPAP Pressure: 16.4 cmH2O AUTO BIPAP - Settings (Shakeel)  IPAP Max: 25 cmH2O  EPAP Min: 11 cmH2O  Pressure Support Min: 4  Pressure Support Max: 6   Comfort Settings  Humidity Level (0-8): 2  Flex/EPR (0-3): 3       ALEXY: She continues to do well with her machine at the current settings. No issues with EDS, snoring, or apneas. She is waking refreshed, for the most part, in the am.  No HA, dryness, or congestion. No issues using her machine. [facility]    [place] - Total score: 3    Current Outpatient Medications   Medication Sig Dispense Refill    naproxen (NAPROSYN) 500 MG tablet TAKE ONE TABLET BY MOUTH TWICE A DAY WITH MEALS 60 tablet 0    celecoxib (CELEBREX) 200 MG capsule Take 200 mg by mouth 2 times daily.  albuterol (PROVENTIL HFA;VENTOLIN HFA) 108 (90 BASE) MCG/ACT inhaler Inhale 2 puffs into the lungs every 6 hours as needed for Wheezing.  lovastatin (MEVACOR) 10 MG tablet Take 10 mg by mouth nightly.  desloratadine (CLARINEX) 5 MG tablet Take 5 mg by mouth.  montelukast (SINGULAIR) 10 MG tablet Take 10 mg by mouth nightly.  hydrochlorothiazide (HYDRODIURIL) 25 MG tablet Take 25 mg by mouth daily.  pravastatin (PRAVACHOL) 40 MG tablet Take 40 mg by mouth daily.  Ciclesonide (ALVESCO) 160 MCG/ACT AERS Inhale 1 puff into the lungs 2 times daily. No current facility-administered medications for this visit.         Allergies as of 08/12/2020 - Review Complete 08/12/2020   Allergen Reaction Noted    Biaxin [clarithromycin] Shortness Of Breath and Swelling 12/10/2010    Cephalexin Shortness Of Breath and Swelling 12/10/2010    Clindamycin/lincomycin Hives 12/10/2010    Penicillins Hives 12/10/2010    Pravastatin  08/12/2020    Sulfa antibiotics  02/11/2016         Electronically signed by Rj Mathias MD on 8/12/2020 at 2:30 PM

## 2020-08-24 RX ORDER — NAPROXEN 500 MG/1
TABLET ORAL
Qty: 60 TABLET | Refills: 0 | Status: SHIPPED | OUTPATIENT
Start: 2020-08-24 | End: 2020-09-24

## 2020-09-24 RX ORDER — NAPROXEN 500 MG/1
TABLET ORAL
Qty: 60 TABLET | Refills: 0 | Status: SHIPPED | OUTPATIENT
Start: 2020-09-24 | End: 2020-10-28

## 2020-10-28 RX ORDER — NAPROXEN 500 MG/1
TABLET ORAL
Qty: 60 TABLET | Refills: 0 | Status: SHIPPED | OUTPATIENT
Start: 2020-10-28

## 2021-06-14 ENCOUNTER — VIRTUAL VISIT (OUTPATIENT)
Dept: PULMONOLOGY | Age: 56
End: 2021-06-14
Payer: COMMERCIAL

## 2021-06-14 DIAGNOSIS — J30.9 ALLERGIC RHINITIS, UNSPECIFIED SEASONALITY, UNSPECIFIED TRIGGER: Chronic | ICD-10-CM

## 2021-06-14 DIAGNOSIS — E66.01 MORBID OBESITY, UNSPECIFIED OBESITY TYPE (HCC): Chronic | ICD-10-CM

## 2021-06-14 DIAGNOSIS — G47.33 OBSTRUCTIVE SLEEP APNEA: Chronic | ICD-10-CM

## 2021-06-14 PROCEDURE — 99214 OFFICE O/P EST MOD 30 MIN: CPT | Performed by: INTERNAL MEDICINE

## 2021-06-14 ASSESSMENT — SLEEP AND FATIGUE QUESTIONNAIRES
HOW LIKELY ARE YOU TO NOD OFF OR FALL ASLEEP WHILE LYING DOWN TO REST IN THE AFTERNOON WHEN CIRCUMSTANCES PERMIT: 0
HOW LIKELY ARE YOU TO NOD OFF OR FALL ASLEEP WHEN YOU ARE A PASSENGER IN A CAR FOR AN HOUR WITHOUT A BREAK: 2
HOW LIKELY ARE YOU TO NOD OFF OR FALL ASLEEP WHILE SITTING INACTIVE IN A PUBLIC PLACE: 0
HOW LIKELY ARE YOU TO NOD OFF OR FALL ASLEEP IN A CAR, WHILE STOPPED FOR A FEW MINUTES IN TRAFFIC: 0
HOW LIKELY ARE YOU TO NOD OFF OR FALL ASLEEP WHILE SITTING QUIETLY AFTER LUNCH WITHOUT ALCOHOL: 0
HOW LIKELY ARE YOU TO NOD OFF OR FALL ASLEEP WHILE SITTING AND TALKING TO SOMEONE: 0
ESS TOTAL SCORE: 3
HOW LIKELY ARE YOU TO NOD OFF OR FALL ASLEEP WHILE SITTING AND READING: 0
HOW LIKELY ARE YOU TO NOD OFF OR FALL ASLEEP WHILE WATCHING TV: 1

## 2021-06-14 NOTE — LETTER
[facility] Sleep Medicine  [address] [facility]  [address] [address]  Phone: [phone]  Fax: [phone]    Trisha Camacho MD    June 14, 2021     Radha Vanessa [address] [address]    Patient: Justin Llamas   MR Number: [id number]   YOB: 1965   Date of Visit: 6/14/2021       Dear Lori Drake: Thank you for referring Le Abreu to me for evaluation/treatment. Below are the relevant portions of my assessment and plan of care. 1. Obstructive sleep apnea  Assessment & Plan:  Chronic-Stable: Reviewed and analyzed results of physiologic download from patient's machine and reviewed with patient. Supplies and parts as needed for her machine. These are medically necessary. Limit caffeine use after 3pm. Based on the analyzed data will continue with current settings. Stable on her machine at current settings, getting benefit from the use, and having minimal side effects. 2. Allergic rhinitis, unspecified seasonality, unspecified trigger  Assessment & Plan:  Chronic- Stable. Discussed the importance of treating obstructive sleep apnea as part of the management of this disorder. Cont any meds per PCP and other physicians. 3. Morbid obesity, unspecified obesity type ([address]  Assessment & Plan:  Chronic-not stable:  Discussed importance of treating obstructive sleep apnea and getting sufficient sleep to assist with weight control. Encouraged her to work on weight loss through diet and exercise. Recommended DASH or Mediterranean diets. Reviewed, analyzed, and documented physiologic data from patient's PAP machine. This information was analyzed to assess complexity and medical decision making in regards to further testing and management. Diagnoses of Obstructive sleep apnea, Allergic rhinitis, unspecified seasonality, unspecified trigger, and Morbid obesity, unspecified obesity type ([address] were pertinent to this visit.  The chronic medical conditions listed are directly related to the primary diagnosis listed above. The management of the primary diagnosis affects the secondary diagnosis and vice versa. If you have questions, please do not hesitate to call me. I look forward to following Samra along with you.     Sincerely,        Yogesh Miranda MD

## 2021-06-14 NOTE — PROGRESS NOTES
Jian Araujo         : 1965    Diagnosis: [x] ALEXY (G47.33) [] CSA (G47.31) [] Apnea (G47.30)   Length of Need: [x] 13 Months [] 99 Months [] Other:    Machine (KORI!): [] Respironics Dream Station      Auto [] ResMed AirSense     Auto [] Other:     []  CPAP () [] Bilevel ()   Mode: [] Auto [] Spontaneous    Mode: [] Auto [] Spontaneous              Comfort Settings:        Humidifier: [] Heated ()        [x] Water chamber replacement ()/ 1 per 6 months        Mask:   [] Nasal () /1 per 3 months [x] Full Face () /1 per 3 months   [] Patient choice -Size and fit mask [x] Patient Choice - Size and fit mask   [] Dispense:  [] Dispense:    [] Headgear () / 1 per 3 months [x] Headgear () / 1 per 3 months   [] Replacement Nasal Cushion ()/2 per month [x] Interface Replacement ()/1 per month   [] Replacement Nasal Pillows ()/2 per month         Tubing: [x] Heated ()/1 per 3 months    [] Standard ()/1 per 3 months [] Other:           Filters: [x] Non-disposable ()/1 per 6 months     [x] Ultra-Fine, Disposable ()/2 per month        Miscellaneous: [] Chin Strap ()/ 1 per 6 months [] O2 bleed-in:       LPM   [] Oximetry on CPAP/Bilevel []  Other:    [x] Modem: ()         Start Order Date: 21    MEDICAL JUSTIFICATION:  I, the undersigned, certify that the above prescribed supplies are medically necessary for this patients wellbeing. In my opinion, the supplies are both reasonable and necessary in reference to accepted standards of medicalpractice in treatment of this patients condition.     Cooper Chirinos MD      NPI: [id number]       Order Signed Date: 21    Electronically signed by Cooper Chirinos MD on 2021 at 3:00 PM    Jwotf Gayle  1965  [place]  [phone] (home) [phone] (work)  [phone] (mobile)      Insurance Info (confirm with patient if correct):  Payor/Plan Subscr  Sex Relation Sub.  Ins. ID Effective Group Num

## 2021-06-14 NOTE — PROGRESS NOTES
Wellington Bryant MD, Juany Osorio, [facility]  Tiffanie Kehrt CNP  Mariusz Zapata CNP [facility] [address]  Sandhya Jigna [address] [facility], [address] [facility] [phone]   [facility] [address] Sandhya Benito. [address] [place] [address]. Kahlil Perez [address] [phone]     Video Visit- Follow up    Pursuant to the emergency declaration under the [address] [place] [address], [facility] waiver authority and the [employer] and [employer] Act, this Virtual  Visit was conducted, with patient's consent, to reduce the patient's risk of exposure to COVID-19 and provide continuity of care for an established patient. Services were provided through a video synchronous discussion virtually to substitute for in-person clinic visit. Patient was located in their home. Assessment/Plan:      1. Obstructive sleep apnea  Assessment & Plan:  Chronic-Stable: Reviewed and analyzed results of physiologic download from patient's machine and reviewed with patient. Supplies and parts as needed for her machine. These are medically necessary. Limit caffeine use after 3pm. Based on the analyzed data will continue with current settings. Stable on her machine at current settings, getting benefit from the use, and having minimal side effects. 2. Allergic rhinitis, unspecified seasonality, unspecified trigger  Assessment & Plan:  Chronic- Stable. Discussed the importance of treating obstructive sleep apnea as part of the management of this disorder. Cont any meds per PCP and other physicians. 3. Morbid obesity, unspecified obesity type ([address]  Assessment & Plan:  Chronic-not stable:  Discussed importance of treating obstructive sleep apnea and getting sufficient sleep to assist with weight control. Encouraged her to work on weight loss through diet and exercise. Recommended DASH or Mediterranean diets. Reviewed, analyzed, and documented physiologic data from patient's PAP machine.     This information was analyzed to assess complexity and medical decision making in regards to further testing and management. Diagnoses of Obstructive sleep apnea, Allergic rhinitis, unspecified seasonality, unspecified trigger, and Morbid obesity, unspecified obesity type ([address] were pertinent to this visit. The chronic medical conditions listed are directly related to the primary diagnosis listed above. The management of the primary diagnosis affects the secondary diagnosis and vice versa. Subjective:     Patient ID: Gustavo Dixon is a 60 y.o. female. Chief Complaint   Patient presents with    Sleep Apnea     Subjective   HPI:    Machine Modem/Download Info:  Compliance (hours/night): 7.1 hrs/night  % of nights >= 4 hrs: 97.8 %  Download AHI (/hour): 0.7 /HR  Average IPAP Pressure: 17 cmH2O  Average EPAP Pressure: 11 cmH2O   AUTO BIPAP - Settings (Shakeel)  IPAP Max: 25 cmH2O  EPAP Min: 11 cmH2O  Pressure Support Min: 4  Pressure Support Max: 6             Comfort Settings  Humidity Level (0-8): 2  Flex/EPR (0-3): 3       She continues to do well with her machine at the current settings. No issues with EDS, snoring, or apneas. She is waking refreshed, for the most part, in the am.  No HA, dryness, or congestion. No issues using her machine.     [facility]    [place] - Total score: 3    Current Outpatient Medications   Medication Instructions    albuterol (PROVENTIL HFA;VENTOLIN HFA) 108 (90 BASE) MCG/ACT inhaler 2 puffs, Inhalation, EVERY 6 HOURS PRN    celecoxib (CELEBREX) 200 mg, Oral, 2 TIMES DAILY    Ciclesonide (ALVESCO) 160 MCG/ACT AERS 1 puff, 2 TIMES DAILY    desloratadine (CLARINEX) 5 mg, Oral    hydroCHLOROthiazide (HYDRODIURIL) 25 mg, DAILY    lovastatin (MEVACOR) 10 mg, Oral, NIGHTLY    montelukast (SINGULAIR) 10 mg, NIGHTLY    naproxen (NAPROSYN) 500 MG tablet TAKE ONE TABLET BY MOUTH TWICE A DAY WITH MEALS    pravastatin (PRAVACHOL) 40 mg, DAILY        Electronically signed by Franco Lobo MD on 6/14/2021 at 3:16 PM

## 2022-06-16 ENCOUNTER — TELEMEDICINE (OUTPATIENT)
Dept: PULMONOLOGY | Age: 57
End: 2022-06-16
Payer: COMMERCIAL

## 2022-06-16 DIAGNOSIS — G47.33 OBSTRUCTIVE SLEEP APNEA: Chronic | ICD-10-CM

## 2022-06-16 DIAGNOSIS — E11.9 NON-INSULIN DEPENDENT TYPE 2 DIABETES MELLITUS (HCC): Chronic | ICD-10-CM

## 2022-06-16 DIAGNOSIS — E66.01 MORBID OBESITY, UNSPECIFIED OBESITY TYPE (HCC): Chronic | ICD-10-CM

## 2022-06-16 PROCEDURE — 99214 OFFICE O/P EST MOD 30 MIN: CPT | Performed by: INTERNAL MEDICINE

## 2022-06-16 ASSESSMENT — SLEEP AND FATIGUE QUESTIONNAIRES
HOW LIKELY ARE YOU TO NOD OFF OR FALL ASLEEP WHILE SITTING QUIETLY AFTER LUNCH WITHOUT ALCOHOL: 0
HOW LIKELY ARE YOU TO NOD OFF OR FALL ASLEEP IN A CAR, WHILE STOPPED FOR A FEW MINUTES IN TRAFFIC: 0
HOW LIKELY ARE YOU TO NOD OFF OR FALL ASLEEP WHEN YOU ARE A PASSENGER IN A CAR FOR AN HOUR WITHOUT A BREAK: 0
ESS TOTAL SCORE: 3
HOW LIKELY ARE YOU TO NOD OFF OR FALL ASLEEP WHILE WATCHING TV: 3
HOW LIKELY ARE YOU TO NOD OFF OR FALL ASLEEP WHILE LYING DOWN TO REST IN THE AFTERNOON WHEN CIRCUMSTANCES PERMIT: 0
HOW LIKELY ARE YOU TO NOD OFF OR FALL ASLEEP WHILE SITTING AND TALKING TO SOMEONE: 0
HOW LIKELY ARE YOU TO NOD OFF OR FALL ASLEEP WHILE SITTING AND READING: 0
HOW LIKELY ARE YOU TO NOD OFF OR FALL ASLEEP WHILE SITTING INACTIVE IN A PUBLIC PLACE: 0

## 2022-06-16 NOTE — PROGRESS NOTES
Juan Manuel Cuenca         : 1965    Diagnosis: [x] ALEXY (G47.33) [] CSA (G47.31) [] Apnea (G47.30)   Length of Need: [x] 18 Months [] 99 Months [] Other:    Machine (KORI!): [] Respironics Dream Station      Auto [] ResMed AirSense     Auto [] Other:     []  CPAP () [] Bilevel ()   Mode: [] Auto [] Spontaneous    Mode: [] Auto [] Spontaneous              Comfort Settings:        Humidifier: [] Heated ()        [x] Water chamber replacement ()/ 1 per 6 months        Mask:   [] Nasal () /1 per 3 months [x] Full Face () /1 per 3 months   [] Patient choice -Size and fit mask [x] Patient Choice - Size and fit mask   [] Dispense:  [] Dispense:    [] Headgear () / 1 per 3 months [x] Headgear () / 1 per 3 months   [] Replacement Nasal Cushion ()/2 per month [x] Interface Replacement ()/1 per month   [] Replacement Nasal Pillows ()/2 per month         Tubing: [x] Heated ()/1 per 3 months    [] Standard ()/1 per 3 months [] Other:           Filters: [x] Non-disposable ()/1 per 6 months     [x] Ultra-Fine, Disposable ()/2 per month        Miscellaneous: [] Chin Strap ()/ 1 per 6 months [] O2 bleed-in:       LPM   [] Oximetry on CPAP/Bilevel []  Other:    [x] Modem: ()         Start Order Date: 22    MEDICAL JUSTIFICATION:  I, the undersigned, certify that the above prescribed supplies are medically necessary for this patients wellbeing. In my opinion, the supplies are both reasonable and necessary in reference to accepted standards of medicalpractice in treatment of this patients condition.     Raven Hebert MD      NPI: [id number]       Order Signed Date: 22    Electronically signed by Raven Hebert MD on 2022 at 2:32 PM    Juan Manuel Cuenca  1965  [place]  [phone] (home) [phone] (work)  [phone] (mobile)      Insurance Info (confirm with patient if correct):  Payor/Plan Subscr  Sex Relation Sub.  Ins. ID Effective Group Num

## 2022-06-16 NOTE — LETTER
[facility]  [place] [address] [place] [address]  Phone: [phone]  Fax: [phone]    Ebony Hinson MD    June 16, 2022     Eddie [address]ia [address] [address]    Patient: Ana Sumner   MR Number: [id number]   YOB: 1965   Date of Visit: 6/16/2022       Dear Serene Nielsen: Thank you for referring Valdemar Mariano to me for evaluation/treatment. Below are the relevant portions of my assessment and plan of care. 1. Obstructive sleep apnea  Assessment & Plan:  Chronic-Stable: Reviewed and analyzed results of physiologic download from patient's machine and reviewed with patient. Supplies and parts as needed for her machine. These are medically necessary. Limit caffeine use after 3pm. Based on the analyzed data will continue with current settings. Discussed with patient today the recent recall issued by [employer] for their Spro[employer]il platform Pap machines. Reviewed that it affected a very small number of machines (0.03%) and seems to be exacerbated by using ozone disinfection or machine being exposed to a very high heat/humidity environment. Recommended the patient immediately discontinue use of any external ozone  if being used. Discussed the risk of untreated sleep apnea (including but not limited to early morbidity mortality, motor vehicle accidents, and cardiovascular issues, etc.) versus the very small risk of foam degradation with use of the machine. Possible alternative may be to switch manufacturers for their machine but will need to see if their insurance company will allow that. 2. Non-insulin dependent type 2 diabetes mellitus ([address]  Assessment & Plan:  Chronic- Stable. Discussed the importance of treating sleep apnea as part of the management of this disorder. Cont any meds per PCP and other physicians.    3. Morbid obesity, unspecified obesity type [facility])  Assessment & Plan:  Chronic-not stable:  Discussed importance of treating obstructive sleep apnea and getting sufficient sleep to assist with weight control. Encouraged her to work on weight loss through diet and exercise. Recommended DASH or Mediterranean diets. Reviewed, analyzed, and documented physiologic data from patient's PAP machine. This information was analyzed to assess complexity and medical decision making in regards to further testing and management. Diagnoses of Obstructive sleep apnea, Non-insulin dependent type 2 diabetes mellitus ([facility] and Morbid obesity, unspecified obesity type ([facility] were pertinent to this visit. The chronic medical conditions listed are directly related to the primary diagnosis listed above. The management of the primary diagnosis affects the secondary diagnosis and vice versa. If you have questions, please do not hesitate to call me. I look forward to following Samra along with you.     Sincerely,      Clari Bay MD

## 2022-06-16 NOTE — PROGRESS NOTES
Ruthie Barcenas [facility] [place]  [address] [facility]  [place]aines, [address] [facility]- [phone]   [facility] Geno Foss. [address] [place] [address]. Kahlil Perez 37 [phone]     Video Visit- Follow up    Kinjal Nielsen, was evaluated through a synchronous (real-time) audio-video  encounter. The patient (or guardian if applicable) is aware that this is a billable  service, which includes applicable co-pays. This Virtual Visit was conducted with  patient's (and/or legal guardian's) consent. The visit was conducted pursuant to  the emergency declaration under the [address] [place] [address] [facility] authority and the [employer] General Act. Patient identification was verified,  and a caregiver was present when appropriate. The patient was located in a  state where the provider was licensed to provide care. Assessment/Plan:      1. Obstructive sleep apnea  Assessment & Plan:  Chronic-Stable: Reviewed and analyzed results of physiologic download from patient's machine and reviewed with patient. Supplies and parts as needed for her machine. These are medically necessary. Limit caffeine use after 3pm. Based on the analyzed data will continue with current settings. Discussed with patient today the recent recall issued by [employer] for their [employer] platform Pap machines. Reviewed that it affected a very small number of machines (0.03%) and seems to be exacerbated by using ozone disinfection or machine being exposed to a very high heat/humidity environment. Recommended the patient immediately discontinue use of any external ozone  if being used. Discussed the risk of untreated sleep apnea (including but not limited to early morbidity mortality, motor vehicle accidents, and cardiovascular issues, etc.) versus the very small risk of foam degradation with use of the machine.   Possible alternative may be to switch manufacturers for their machine but will need to see if their insurance company will allow that. 2. Non-insulin dependent type 2 diabetes mellitus ([address]  Assessment & Plan:  Chronic- Stable. Discussed the importance of treating sleep apnea as part of the management of this disorder. Cont any meds per PCP and other physicians. 3. Morbid obesity, unspecified obesity type ([address]  Assessment & Plan:  Chronic-not stable:  Discussed importance of treating obstructive sleep apnea and getting sufficient sleep to assist with weight control. Encouraged her to work on weight loss through diet and exercise. Recommended DASH or Mediterranean diets. Reviewed, analyzed, and documented physiologic data from patient's PAP machine. This information was analyzed to assess complexity and medical decision making in regards to further testing and management. Diagnoses of Obstructive sleep apnea, Non-insulin dependent type 2 diabetes mellitus ([address] and Morbid obesity, unspecified obesity type ([facility] were pertinent to this visit. The chronic medical conditions listed are directly related to the primary diagnosis listed above. The management of the primary diagnosis affects the secondary diagnosis and vice versa. Subjective:     Patient ID: Caroline Cortez is a 59 y.o. female. Chief Complaint   Patient presents with    Sleep Apnea     Subjective   HPI:    Machine Modem/Download Info:  Compliance (hours/night): 6.9 hrs/night  % of nights >= 4 hrs: 97.8 %  Download AHI (/hour): 1 /HR  Average IPAP Pressure: 16.5 cmH2O  Average EPAP Pressure: 11 cmH2O   AUTO BIPAP - Settings ([place])  IPAP Max: 25 cmH2O  EPAP Min: 11 cmH2O  Pressure Support Min: 4  Pressure Support Max: 6             Comfort Settings  Humidity Level (0-8): 2  Flex/EPR (0-3): 3       She continues to do well with her machine at the current settings. No issues with EDS, snoring, or apneas. Want to get supplies from local pharmacy.   Patient states about 6 hours then wakes up in melanite and not able to go back to bed. She feels that may be from chronic knee pain that she has had and she does have a follow-up with her surgeon coming up in the next few weeks.     [facility]    [place] - Total score: 3    Current Outpatient Medications   Medication Instructions    albuterol (PROVENTIL HFA;VENTOLIN HFA) 108 (90 BASE) MCG/ACT inhaler 2 puffs, Inhalation, EVERY 6 HOURS PRN    celecoxib (CELEBREX) 200 mg, Oral, 2 TIMES DAILY    Ciclesonide (ALVESCO) 160 MCG/ACT AERS 1 puff, 2 TIMES DAILY    desloratadine (CLARINEX) 5 mg, Oral    hydroCHLOROthiazide (HYDRODIURIL) 25 mg, DAILY    lovastatin (MEVACOR) 10 mg, Oral, NIGHTLY    metFORMIN (GLUCOPHAGE) 500 mg, Oral, 2 times daily    montelukast (SINGULAIR) 10 mg, NIGHTLY    naproxen (NAPROSYN) 500 MG tablet TAKE ONE TABLET BY MOUTH TWICE A DAY WITH MEALS    pravastatin (PRAVACHOL) 40 mg, DAILY        Electronically signed by Delisa Johnson MD on 6/16/2022 at 2:44 PM

## 2022-09-14 ENCOUNTER — TELEPHONE (OUTPATIENT)
Dept: PULMONOLOGY | Age: 57
End: 2022-09-14

## 2022-09-19 ENCOUNTER — TELEPHONE (OUTPATIENT)
Dept: PULMONOLOGY | Age: 57
End: 2022-09-19

## 2022-10-17 ENCOUNTER — TELEPHONE (OUTPATIENT)
Dept: PULMONOLOGY | Age: 57
End: 2022-10-17

## 2022-10-17 NOTE — TELEPHONE ENCOUNTER
Patient received new machine from recall, will take to [facility] to adjust to proper settings. I let the patient know JITENDRARV should have on record what her settings need to be ar, patient requested we email her documentation of what the settings should be at, patient confirmed email in chart is correct, please advise.

## 2023-08-08 ENCOUNTER — OFFICE VISIT (OUTPATIENT)
Dept: PULMONOLOGY | Age: 58
End: 2023-08-08
Payer: COMMERCIAL

## 2023-08-08 VITALS
DIASTOLIC BLOOD PRESSURE: 72 MMHG | WEIGHT: 267.8 LBS | HEIGHT: 65 IN | BODY MASS INDEX: 44.62 KG/M2 | SYSTOLIC BLOOD PRESSURE: 96 MMHG | HEART RATE: 97 BPM | OXYGEN SATURATION: 95 %

## 2023-08-08 DIAGNOSIS — E11.9 NON-INSULIN DEPENDENT TYPE 2 DIABETES MELLITUS (HCC): Chronic | ICD-10-CM

## 2023-08-08 DIAGNOSIS — E66.01 MORBID OBESITY, UNSPECIFIED OBESITY TYPE (HCC): Chronic | ICD-10-CM

## 2023-08-08 DIAGNOSIS — G47.33 OBSTRUCTIVE SLEEP APNEA: Chronic | ICD-10-CM

## 2023-08-08 PROCEDURE — 99214 OFFICE O/P EST MOD 30 MIN: CPT | Performed by: INTERNAL MEDICINE

## 2023-08-08 ASSESSMENT — SLEEP AND FATIGUE QUESTIONNAIRES
ESS TOTAL SCORE: 2
HOW LIKELY ARE YOU TO NOD OFF OR FALL ASLEEP WHILE WATCHING TV: 1
HOW LIKELY ARE YOU TO NOD OFF OR FALL ASLEEP WHILE SITTING QUIETLY AFTER LUNCH WITHOUT ALCOHOL: 0
HOW LIKELY ARE YOU TO NOD OFF OR FALL ASLEEP WHILE SITTING AND READING: 0
HOW LIKELY ARE YOU TO NOD OFF OR FALL ASLEEP WHILE SITTING INACTIVE IN A PUBLIC PLACE: 0
HOW LIKELY ARE YOU TO NOD OFF OR FALL ASLEEP IN A CAR, WHILE STOPPED FOR A FEW MINUTES IN TRAFFIC: 0
HOW LIKELY ARE YOU TO NOD OFF OR FALL ASLEEP WHILE SITTING AND TALKING TO SOMEONE: 0
HOW LIKELY ARE YOU TO NOD OFF OR FALL ASLEEP WHEN YOU ARE A PASSENGER IN A CAR FOR AN HOUR WITHOUT A BREAK: 1
HOW LIKELY ARE YOU TO NOD OFF OR FALL ASLEEP WHILE LYING DOWN TO REST IN THE AFTERNOON WHEN CIRCUMSTANCES PERMIT: 0

## 2023-08-08 NOTE — ASSESSMENT & PLAN NOTE
Chronic-Stable: Reviewed and analyzed results of physiologic download from patient's machine and reviewed with patient. Supplies and parts as needed for her machine. These are medically necessary.   Limit caffeine use after 3pm. Based on the analyzed data will change following settings: EPAPmin-8  PSmin-3 given her weight loss - giving him time to see how he does, on medical therapies.   - neurology is following